# Patient Record
Sex: MALE | Race: WHITE | NOT HISPANIC OR LATINO | Employment: STUDENT | RURAL
[De-identification: names, ages, dates, MRNs, and addresses within clinical notes are randomized per-mention and may not be internally consistent; named-entity substitution may affect disease eponyms.]

---
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
## 2024-03-20 ENCOUNTER — PATIENT OUTREACH (OUTPATIENT)
Dept: ADMINISTRATIVE | Facility: HOSPITAL | Age: 19
End: 2024-03-20

## 2024-03-20 ENCOUNTER — PATIENT MESSAGE (OUTPATIENT)
Dept: ADMINISTRATIVE | Facility: HOSPITAL | Age: 19
End: 2024-03-20

## 2024-03-20 NOTE — PROGRESS NOTES
Population Health Chart Review & Patient Outreach Details  Per [facility] website, insurance is active and pt is listed on the attributed list needing a healthy you performed in   Sent Picket message to Dimitrios to schedule hy  .Sent patient portal message regarding need for:   hy    Further Action Needed If Patient Returns Outreach:            Updates Requested / Reviewed:     []  Care Everywhere    []     []  External Sources ([facility], [facility], DIS, etc.)    [] [facility]   [] [facility]   [] Other:    []  Care Team Updated   []  Removed  or Duplicate Orders   []  Immunization Reconciliation Completed / Queried    [] [place]   [] [place]   [] [place]   [] [place]      Health Maintenance Topics Addressed and Outreach Outcomes / Actions Taken:             Breast Cancer Screening []  Mammogram Order Placed    []  Mammogram Screening Scheduled    []  External Records Requested & Care Team Updated if Applicable    []  External Records Uploaded & Care Team Updated if Applicable    []  Pt Declined Scheduling Mammogram    []  Pt Will Schedule with External Provider / Order Routed & Care Team Updated if Applicable              Cervical Cancer Screening []  Pap Smear Scheduled in Primary Care or OBGYN    []  External Records Requested & Care Team Updated if Applicable       []  External Records Uploaded, Care Team Updated, & History Updated if Applicable    []  Patient Declined Scheduling Pap Smear    []  Patient Will Schedule with External Provider & Care Team Updated if Applicable                  Colorectal Cancer Screening []  Colonoscopy Case Request / Referral / Home Test Order Placed    []  External Records Requested & Care Team Updated if Applicable    []  External Records Uploaded, Care Team Updated, & History Updated if Applicable    []  Patient Declined Completing Colon Cancer Screening    []  Patient Will Schedule with External Provider & Care Team Updated if Applicable    []  Fit Kit Mailed (add  the SmartPhrase under additional notes)    []  Reminded Patient to Complete Home Test                Diabetic Eye Exam []  Eye Exam Screening Order Placed    []  Eye Camera Scheduled or Optometry/Ophthalmology Referral Placed    []  External Records Requested & Care Team Updated if Applicable    []  External Records Uploaded, Care Team Updated, & History Updated if Applicable    []  Patient Declined Scheduling Eye Exam    []  Patient Will Schedule with External Provider & Care Team Updated if Applicable             Blood Pressure Control []  Primary Care Follow Up Visit Scheduled     []  Remote Blood Pressure Reading Captured    []  Patient Declined Remote Reading or Scheduling Appt - Escalated to PCP    []  Patient Will Call Back or Send Portal Message with Reading                 HbA1c & Other Labs []  Overdue Lab(s) Ordered    []  Overdue Lab(s) Scheduled    []  External Records Uploaded & Care Team Updated if Applicable    []  Primary Care Follow Up Visit Scheduled     []  Reminded Patient to Complete A1c Home Test    []  Patient Declined Scheduling Labs or Will Call Back to Schedule    []  Patient Will Schedule with External Provider / Order Routed, & Care Team Updated if Applicable           Primary Care Appointment []  Primary Care Appt Scheduled    []  Patient Declined Scheduling or Will Call Back to Schedule    []  Pt Established with External Provider, Updated Care Team, & Informed Pt to Notify Payor if Applicable           Medication Adherence /    Statin Use []  Primary Care Appointment Scheduled    []  Patient Reminded to  Prescription    []  Patient Declined, Provider Notified if Needed    []  Sent Provider Message to Review to Evaluate Pt for Statin, Add Exclusion Dx Codes, Document   Exclusion in Problem List, Change Statin Intensity Level to Moderate or High Intensity if Applicable                Osteoporosis Screening []  Dexa Order Placed    []  Dexa Appointment Scheduled    []  External  Records Requested & Care Team Updated    []  External Records Uploaded, Care Team Updated, & History Updated if Applicable    []  Patient Declined Scheduling Dexa or Will Call Back to Schedule    []  Patient Will Schedule with External Provider / Order Routed & Care Team Updated if Applicable       Additional Notes:

## 2024-07-25 ENCOUNTER — OFFICE VISIT (OUTPATIENT)
Dept: FAMILY MEDICINE | Facility: CLINIC | Age: 19
End: 2024-07-25
Payer: COMMERCIAL

## 2024-07-25 VITALS
RESPIRATION RATE: 18 BRPM | TEMPERATURE: 97 F | OXYGEN SATURATION: 98 % | SYSTOLIC BLOOD PRESSURE: 110 MMHG | WEIGHT: 152 LBS | BODY MASS INDEX: 21.28 KG/M2 | DIASTOLIC BLOOD PRESSURE: 76 MMHG | HEIGHT: 71 IN | HEART RATE: 66 BPM

## 2024-07-25 DIAGNOSIS — L74.510 HYPERHIDROSIS OF AXILLA: Primary | ICD-10-CM

## 2024-07-25 PROCEDURE — 3008F BODY MASS INDEX DOCD: CPT | Mod: ,,, | Performed by: NURSE PRACTITIONER

## 2024-07-25 PROCEDURE — 3078F DIAST BP <80 MM HG: CPT | Mod: ,,, | Performed by: NURSE PRACTITIONER

## 2024-07-25 PROCEDURE — 1159F MED LIST DOCD IN RCRD: CPT | Mod: ,,, | Performed by: NURSE PRACTITIONER

## 2024-07-25 PROCEDURE — 3074F SYST BP LT 130 MM HG: CPT | Mod: ,,, | Performed by: NURSE PRACTITIONER

## 2024-07-25 PROCEDURE — 99213 OFFICE O/P EST LOW 20 MIN: CPT | Mod: ,,, | Performed by: NURSE PRACTITIONER

## 2024-07-25 PROCEDURE — 1160F RVW MEDS BY RX/DR IN RCRD: CPT | Mod: ,,, | Performed by: NURSE PRACTITIONER

## 2024-07-25 RX ORDER — OXYBUTYNIN CHLORIDE 5 MG/1
5 TABLET, EXTENDED RELEASE ORAL DAILY
Qty: 90 TABLET | Refills: 1 | Status: SHIPPED | OUTPATIENT
Start: 2024-07-25

## 2024-07-25 NOTE — PROGRESS NOTES
RICARDO Sun   [facility]  [address] [address] [address]  [phone]      PATIENT NAME: Scott Brewer  : 2005  DATE: 24  MRN: [id number]      Billing Provider: RICARDO Sun  Level of Service:   Patient PCP Information       Provider PCP Type    RICARDO Sun General            Reason for Visit / Chief Complaint: Excessive Sweating (Has excessive armpit swelling./States they've tried otc prescription deodorant.)       Update PCP  Update Chief Complaint         History of Present Illness / Problem Focused Workflow     18 year old male presents with mom with complaints of excessive sweating  Reports they have tried several OTC products and has not helped  States seems to be getting worse        Review of Systems     Review of Systems   Constitutional:  Negative for fatigue and fever.   HENT:  Negative for congestion.    Respiratory:  Negative for cough and shortness of breath.    Cardiovascular:  Negative for chest pain.   Gastrointestinal:  Negative for abdominal pain, constipation, diarrhea and vomiting.   Endocrine: Negative for polydipsia and polyuria.   Musculoskeletal:  Negative for arthralgias and gait problem.   Skin:         Excessive sweating   Allergic/Immunologic: Negative for environmental allergies.   Neurological:  Negative for dizziness, weakness and headaches.   Psychiatric/Behavioral:  Negative for dysphoric mood. The patient is not nervous/anxious.        Medical / Social / Family History   History reviewed. No pertinent past medical history.    Past Surgical History:   Procedure Laterality Date    KNEE SURGERY Right        Social History    reports that he has never smoked. He has never used smokeless tobacco. He reports that he does not drink alcohol and does not use drugs.    Family History  's family history includes Cancer in his maternal grandfather.    Medications and Allergies      Medications  No outpatient medications have been marked as taking for the 7/25/24 encounter (Office Visit) with Megan Saldana FNP.       Allergies  Review of patient's allergies indicates:  No Known Allergies    Physical Examination     Vitals:    07/25/24 0834   BP: 110/76   Pulse: 66   Resp: 18   Temp: 97.2 °F (36.2 °C)     Physical Exam  Constitutional:       General: He is not in acute distress.  HENT:      Nose: Nose normal.      Mouth/Throat:      Mouth: Mucous membranes are moist.   Eyes:      Extraocular Movements: Extraocular movements intact.   Cardiovascular:      Rate and Rhythm: Normal rate.   Pulmonary:      Effort: Pulmonary effort is normal. No respiratory distress.   Abdominal:      General: Bowel sounds are normal.      Palpations: Abdomen is soft.      Tenderness: There is no abdominal tenderness.   Musculoskeletal:         General: Normal range of motion.      Cervical back: Normal range of motion.   Skin:     General: Skin is warm.   Neurological:      Mental Status: He is alert.   Psychiatric:         Behavior: Behavior normal.           Imaging / Labs     No visits with results within 1 Day(s) from this visit.   Latest known visit with results is:   Office Visit on 07/05/2023   Component Date Value Ref Range Status    HIV 1/2 07/05/2023 Non-Reactive  Non-Reactive Final    Syphilis Ab Interpretation 07/05/2023 Non-Reactive  Non-Reactive Final    Spotted Fever Group Ab, IgG, S 07/05/2023 <1:64  <1:64 Final    Spotted Fever Group Ab, IgM, S 07/05/2023 <1:64  <1:64 Final    Lyme IgG/IgM 07/05/2023 Non-Reactive  Non-Reactive Final    HSV 1, IgG 07/05/2023 Negative  Negative Final    HSV Type 1 Ab IgG Index 07/05/2023 0.03   Final    HSV 2, IgG 07/05/2023 Negative  Negative Final    HSV Type 2 Ab IgG Index 07/05/2023 0.01   Final    Sodium 07/05/2023 143  136 - 145 mmol/L Final    Potassium 07/05/2023 5.0  3.5 - 5.1 mmol/L Final    Chloride 07/05/2023 107  98 - 107 mmol/L Final    CO2  07/05/2023 31  21 - 32 mmol/L Final    Anion Gap 07/05/2023 10  7 - 16 mmol/L Final    Glucose 07/05/2023 77  74 - 106 mg/dL Final    BUN 07/05/2023 20 (H)  7 - 18 mg/dL Final    Creatinine 07/05/2023 1.26  0.70 - 1.30 mg/dL Final    BUN/Creatinine Ratio 07/05/2023 16  6 - 20 Final    Calcium 07/05/2023 9.4  8.5 - 10.1 mg/dL Final    Total Protein 07/05/2023 7.0  6.4 - 8.2 g/dL Final    Albumin 07/05/2023 3.9  3.5 - 5.0 g/dL Final    Globulin 07/05/2023 3.1  2.0 - 4.0 g/dL Final    A/G Ratio 07/05/2023 1.3   Final    Bilirubin, Total 07/05/2023 0.3  >0.0 - 1.2 mg/dL Final    Alk Phos 07/05/2023 128  52 - 222 U/L Final    ALT 07/05/2023 35  16 - 61 U/L Final    AST 07/05/2023 29  15 - 37 U/L Final    eGFR 07/05/2023 85  >=60 mL/min/1.73m2 Final    Mono, Blood 07/05/2023 Positive (A)  Negative, QNS Final    WBC 07/05/2023 6.89  4.50 - 11.00 K/uL Final    RBC 07/05/2023 5.25  4.60 - 6.20 M/uL Final    Hemoglobin 07/05/2023 14.5  13.5 - 18.0 g/dL Final    Hematocrit 07/05/2023 44.8  40.0 - 54.0 % Final    MCV 07/05/2023 85.3  80.0 - 96.0 fL Final    MCH 07/05/2023 27.6  27.0 - 31.0 pg Final    MCHC 07/05/2023 32.4  32.0 - 36.0 g/dL Final    RDW 07/05/2023 12.6  11.5 - 14.5 % Final    Platelet Count 07/05/2023 288  150 - 400 K/uL Final    MPV 07/05/2023 10.5  9.4 - 12.4 fL Final    Neutrophils % 07/05/2023 47.8 (L)  53.0 - 65.0 % Final    Lymphocytes % 07/05/2023 38.9  27.0 - 41.0 % Final    Monocytes % 07/05/2023 8.7 (H)  2.0 - 6.0 % Final    Eosinophils % 07/05/2023 3.9  1.0 - 4.0 % Final    Basophils % 07/05/2023 0.6  0.0 - 1.0 % Final    Immature Granulocytes % 07/05/2023 0.1  0.0 - 0.4 % Final    nRBC, Auto 07/05/2023 0.0  <=0.0 % Final    Neutrophils, Abs 07/05/2023 3.29  1.80 - 7.70 K/uL Final    Lymphocytes, Absolute 07/05/2023 2.68  1.00 - 4.80 K/uL Final    Monocytes, Absolute 07/05/2023 0.60  0.00 - 0.80 K/uL Final    Eosinophils, Absolute 07/05/2023 0.27  0.00 - 0.50 K/uL Final    Basophils, Absolute  07/05/2023 0.04  0.00 - 0.20 K/uL Final    Immature Granulocytes, Absolute 07/05/2023 0.01  0.00 - 0.04 K/uL Final    nRBC, Absolute 07/05/2023 0.00  <=0.00 x10e3/uL Final    Diff Type 07/05/2023 Auto   Final     No image results found.      Assessment and Plan (including Health Maintenance)      Problem List  Smart Sets  Document Outside HM   :    Health Maintenance Due   Topic Date Due    Hepatitis C Screening  Never done    Lipid Panel  Never done    HPV Vaccines (1 - Male 3-dose series) Never done    COVID-19 Vaccine (1 - 2023-24 season) Never done       Problem List Items Addressed This Visit          Derm    Hyperhidrosis of axilla - Primary    Current Assessment & Plan     Reports this has been on ongoing problem for him that seems to be getting worse  Has tried OTC products without much change  Discussed risks/benefits/alternatives for treatment  Start ditropan po, drysol deodorant daily             Relevant Medications    oxybutynin (DITROPAN-XL) 5 MG TR24    aluminum chloride (DRYSOL) 20 % external solution       Health Maintenance Topics with due status: Not Due       Topic Last Completion Date    TETANUS VACCINE 07/26/2017    Influenza Vaccine Not Due       Future Appointments   Date Time Provider Department Center   10/24/2024  3:00 PM Megan Saldana FNP [facility]          Signature:  RICARDO Sun [facility] [address] [place] [address]  [phone]    Date of encounter: 7/25/24

## 2024-07-25 NOTE — ASSESSMENT & PLAN NOTE
Reports this has been on ongoing problem for him that seems to be getting worse  Has tried OTC products without much change  Discussed risks/benefits/alternatives for treatment  Start ditropan po, drysol deodorant daily

## 2024-10-24 ENCOUNTER — OFFICE VISIT (OUTPATIENT)
Dept: FAMILY MEDICINE | Facility: CLINIC | Age: 19
End: 2024-10-24
Payer: COMMERCIAL

## 2024-10-24 VITALS
RESPIRATION RATE: 18 BRPM | HEART RATE: 92 BPM | WEIGHT: 162.81 LBS | BODY MASS INDEX: 22.79 KG/M2 | TEMPERATURE: 98 F | OXYGEN SATURATION: 98 % | HEIGHT: 71 IN | SYSTOLIC BLOOD PRESSURE: 122 MMHG | DIASTOLIC BLOOD PRESSURE: 80 MMHG

## 2024-10-24 DIAGNOSIS — L74.510 HYPERHIDROSIS OF AXILLA: ICD-10-CM

## 2024-10-24 DIAGNOSIS — Z11.59 ENCOUNTER FOR HEPATITIS C SCREENING TEST FOR LOW RISK PATIENT: ICD-10-CM

## 2024-10-24 DIAGNOSIS — Z13.220 SCREENING FOR LIPID DISORDERS: ICD-10-CM

## 2024-10-24 DIAGNOSIS — Z00.00 ROUTINE GENERAL MEDICAL EXAMINATION AT A HEALTH CARE FACILITY: Primary | ICD-10-CM

## 2024-10-24 PROCEDURE — 1160F RVW MEDS BY RX/DR IN RCRD: CPT | Mod: ,,, | Performed by: NURSE PRACTITIONER

## 2024-10-24 PROCEDURE — 1159F MED LIST DOCD IN RCRD: CPT | Mod: ,,, | Performed by: NURSE PRACTITIONER

## 2024-10-24 PROCEDURE — 3079F DIAST BP 80-89 MM HG: CPT | Mod: ,,, | Performed by: NURSE PRACTITIONER

## 2024-10-24 PROCEDURE — 3008F BODY MASS INDEX DOCD: CPT | Mod: ,,, | Performed by: NURSE PRACTITIONER

## 2024-10-24 PROCEDURE — 99395 PREV VISIT EST AGE 18-39: CPT | Mod: ,,, | Performed by: NURSE PRACTITIONER

## 2024-10-24 PROCEDURE — 3074F SYST BP LT 130 MM HG: CPT | Mod: ,,, | Performed by: NURSE PRACTITIONER

## 2024-10-24 RX ORDER — OXYBUTYNIN CHLORIDE 10 MG/1
10 TABLET, EXTENDED RELEASE ORAL DAILY
Qty: 30 TABLET | Refills: 5 | Status: SHIPPED | OUTPATIENT
Start: 2024-10-24 | End: 2025-10-24

## 2024-10-24 RX ORDER — OXYBUTYNIN CHLORIDE 5 MG/1
5 TABLET, EXTENDED RELEASE ORAL DAILY
Qty: 90 TABLET | Refills: 1 | Status: CANCELLED | OUTPATIENT
Start: 2024-10-24

## 2024-10-25 ENCOUNTER — PATIENT OUTREACH (OUTPATIENT)
Facility: HOSPITAL | Age: 19
End: 2024-10-25
Payer: COMMERCIAL

## 2024-10-25 NOTE — PROGRESS NOTES
Population Health Chart Review & Patient Outreach Details      Further Action Needed If Patient Returns Outreach:            Updates Requested / Reviewed:     []  Care Everywhere    []     []  External Sources ([facility], [facility], DIS, etc.)    [] [facility]   [] [facility]   [] Other:    []  Care Team Updated   []  Removed  or Duplicate Orders   []  Immunization Reconciliation Completed / Queried    [] [place]   [] [place]   [] [place]   [] [place]      Health Maintenance Topics Addressed and Outreach Outcomes / Actions Taken:             Breast Cancer Screening []  Mammogram Order Placed    []  Mammogram Screening Scheduled    []  External Records Requested & Care Team Updated if Applicable    []  External Records Uploaded & Care Team Updated if Applicable    []  Pt Declined Scheduling Mammogram    []  Pt Will Schedule with External Provider / Order Routed & Care Team Updated if Applicable              Cervical Cancer Screening []  Pap Smear Scheduled in Primary Care or OBGYN    []  External Records Requested & Care Team Updated if Applicable       []  External Records Uploaded, Care Team Updated, & History Updated if Applicable    []  Patient Declined Scheduling Pap Smear    []  Patient Will Schedule with External Provider & Care Team Updated if Applicable                  Colorectal Cancer Screening []  Colonoscopy Case Request / Referral / Home Test Order Placed    []  External Records Requested & Care Team Updated if Applicable    []  External Records Uploaded, Care Team Updated, & History Updated if Applicable    []  Patient Declined Completing Colon Cancer Screening    []  Patient Will Schedule with External Provider & Care Team Updated if Applicable    []  Fit Kit Mailed (add the SmartPhrase under additional notes)    []  Reminded Patient to Complete Home Test                Diabetic Eye Exam []  Eye Exam Screening Order Placed    []  Eye Camera Scheduled or Optometry/Ophthalmology Referral  Placed    []  External Records Requested & Care Team Updated if Applicable    []  External Records Uploaded, Care Team Updated, & History Updated if Applicable    []  Patient Declined Scheduling Eye Exam    []  Patient Will Schedule with External Provider & Care Team Updated if Applicable             Blood Pressure Control []  Primary Care Follow Up Visit Scheduled     []  Remote Blood Pressure Reading Captured    []  Patient Declined Remote Reading or Scheduling Appt - Escalated to PCP    []  Patient Will Call Back or Send Portal Message with Reading                 HbA1c & Other Labs []  Overdue Lab(s) Ordered    []  Overdue Lab(s) Scheduled    []  External Records Uploaded & Care Team Updated if Applicable    []  Primary Care Follow Up Visit Scheduled     []  Reminded Patient to Complete A1c Home Test    []  Patient Declined Scheduling Labs or Will Call Back to Schedule    []  Patient Will Schedule with External Provider / Order Routed, & Care Team Updated if Applicable           Primary Care Appointment []  Primary Care Appt Scheduled    []  Patient Declined Scheduling or Will Call Back to Schedule    []  Pt Established with External Provider, Updated Care Team, & Informed Pt to Notify Payor if Applicable           Medication Adherence /    Statin Use []  Primary Care Appointment Scheduled    []  Patient Reminded to  Prescription    []  Patient Declined, Provider Notified if Needed    []  Sent Provider Message to Review to Evaluate Pt for Statin, Add Exclusion Dx Codes, Document   Exclusion in Problem List, Change Statin Intensity Level to Moderate or High Intensity if Applicable                Osteoporosis Screening []  Dexa Order Placed    []  Dexa Appointment Scheduled    []  External Records Requested & Care Team Updated    []  External Records Uploaded, Care Team Updated, & History Updated if Applicable    []  Patient Declined Scheduling Dexa or Will Call Back to Schedule    []  Patient Will Schedule  with External Provider / Order Routed & Care Team Updated if Applicable       Additional Notes:.  Post visit Population Health review of encounter with date of service  10/24/24 with Shana.  Not All required HY components in encounter.  Chart is opened and needs cpt for age  18.  Followup appt for10/27/25 HY

## 2024-10-29 PROBLEM — Z00.00 ROUTINE GENERAL MEDICAL EXAMINATION AT A HEALTH CARE FACILITY: Status: ACTIVE | Noted: 2024-10-29

## 2024-12-18 ENCOUNTER — OFFICE VISIT (OUTPATIENT)
Dept: FAMILY MEDICINE | Facility: CLINIC | Age: 19
End: 2024-12-18
Payer: COMMERCIAL

## 2024-12-18 VITALS
WEIGHT: 156.19 LBS | BODY MASS INDEX: 21.87 KG/M2 | RESPIRATION RATE: 20 BRPM | OXYGEN SATURATION: 98 % | HEART RATE: 90 BPM | TEMPERATURE: 97 F | DIASTOLIC BLOOD PRESSURE: 82 MMHG | HEIGHT: 71 IN | SYSTOLIC BLOOD PRESSURE: 125 MMHG

## 2024-12-18 DIAGNOSIS — J32.4 PANSINUSITIS, UNSPECIFIED CHRONICITY: Primary | ICD-10-CM

## 2024-12-18 DIAGNOSIS — R68.84 JAW PAIN: ICD-10-CM

## 2024-12-18 DIAGNOSIS — R22.0 JAW SWELLING: ICD-10-CM

## 2024-12-18 DIAGNOSIS — R09.81 HEAD CONGESTION: ICD-10-CM

## 2024-12-18 DIAGNOSIS — H65.03 BILATERAL ACUTE SEROUS OTITIS MEDIA, RECURRENCE NOT SPECIFIED: ICD-10-CM

## 2024-12-18 PROCEDURE — 99213 OFFICE O/P EST LOW 20 MIN: CPT | Mod: ,,, | Performed by: NURSE PRACTITIONER

## 2024-12-18 PROCEDURE — 3074F SYST BP LT 130 MM HG: CPT | Mod: ,,, | Performed by: NURSE PRACTITIONER

## 2024-12-18 PROCEDURE — 1159F MED LIST DOCD IN RCRD: CPT | Mod: ,,, | Performed by: NURSE PRACTITIONER

## 2024-12-18 PROCEDURE — 3079F DIAST BP 80-89 MM HG: CPT | Mod: ,,, | Performed by: NURSE PRACTITIONER

## 2024-12-18 PROCEDURE — 1160F RVW MEDS BY RX/DR IN RCRD: CPT | Mod: ,,, | Performed by: NURSE PRACTITIONER

## 2024-12-18 PROCEDURE — 3008F BODY MASS INDEX DOCD: CPT | Mod: ,,, | Performed by: NURSE PRACTITIONER

## 2024-12-18 RX ORDER — METHYLPREDNISOLONE 4 MG/1
TABLET ORAL
Qty: 21 EACH | Refills: 0 | Status: SHIPPED | OUTPATIENT
Start: 2024-12-18 | End: 2025-01-08

## 2024-12-18 RX ORDER — CEFDINIR 300 MG/1
300 CAPSULE ORAL 2 TIMES DAILY
Qty: 20 CAPSULE | Refills: 0 | Status: SHIPPED | OUTPATIENT
Start: 2024-12-18 | End: 2024-12-28

## 2024-12-19 ENCOUNTER — HOSPITAL ENCOUNTER (OUTPATIENT)
Dept: RADIOLOGY | Facility: HOSPITAL | Age: 19
Discharge: HOME | End: 2024-12-19
Attending: NURSE PRACTITIONER
Payer: COMMERCIAL

## 2024-12-19 DIAGNOSIS — R68.84 JAW PAIN: ICD-10-CM

## 2024-12-19 DIAGNOSIS — R22.0 JAW SWELLING: ICD-10-CM

## 2024-12-19 PROCEDURE — 70110 X-RAY EXAM OF JAW 4/> VIEWS: CPT | Mod: 26,,, | Performed by: RADIOLOGY

## 2024-12-19 PROCEDURE — 70110 X-RAY EXAM OF JAW 4/> VIEWS: CPT | Mod: TC

## 2024-12-28 NOTE — PROGRESS NOTES
RICARDO Sun   [facility] [address] [place] [address]  [phone]      PATIENT NAME: Scott Brewer  : 2005  DATE: 24  MRN: [id number]      Billing Provider: RICARDO Sun  Level of Service:   Patient PCP Information       Provider PCP Type    RICARDO Sun General                Medications and Allergies     Medications  Outpatient Medications Marked as Taking for the 24 encounter (Office Visit) with Megan Saldana FNP   Medication Sig Dispense Refill    aluminum chloride (DRYSOL) 20 % external solution Apply topically every evening. 60 mL 3    oxybutynin (DITROPAN-XL) 10 MG 24 hr tablet Take 1 tablet (10 mg total) by mouth once daily. 30 tablet 5       Allergies  Review of patient's allergies indicates:  No Known Allergies    History of Present Illness    CHIEF COMPLAINT:  Patient presents today with jaw pain.    TEMPOROMANDIBULAR JOINT (TMJ) SYMPTOMS:  He reports jaw pain present for 3-4 months with unilateral stiffness. He denies morning jaw or teeth soreness, no known grinding teeth at night.    ENT SYMPTOMS:  He reports productive cough and has experienced two nosebleeds in the past 1.5 weeks.      ROS:  General: -fever, -chills, -fatigue, -weight gain, +weight loss  Eyes: -vision changes, -redness, -discharge  ENT: -ear pain, +nasal congestion, -sore throat  Cardiovascular: -chest pain, -palpitations, -lower extremity edema  Respiratory: +cough, -shortness of breath  Gastrointestinal: -abdominal pain, -nausea, -vomiting, -diarrhea, -constipation, -blood in stool  Genitourinary: -dysuria, -hematuria, -frequency  Musculoskeletal: -joint pain, -muscle pain  Skin: -rash, -lesion  Neurological: -headache, -dizziness, -numbness, -tingling  Psychiatric: -anxiety, -depression, -sleep difficulty          Physical Exam    General: No acute distress. Well-developed. Well-nourished.  Eyes: EOMI. Sclerae  anicteric.  HENT: Normocephalic. Atraumatic. Nares patent. Moist oral mucosa.  Ears: Erythematous tympanic membrane with irritation and serous drainage.  Cardiovascular: Regular rate. Regular rhythm.   Respiratory: Normal respiratory effort. Clear to auscultation bilaterally. No rales. No rhonchi. No wheezing.  Abdomen: Soft. Non-tender. Non-distended. Normoactive bowel sounds.  Musculoskeletal: No  obvious deformity.  Extremities: No lower extremity edema.  Neurological: Alert & oriented x3. No slurred speech. Normal gait.  Psychiatric: Normal mood. Normal affect. Good insight. Good judgment.  Skin: Warm. Dry. No rash.          Assessment & Plan    IMPRESSION:  - Suspected jaw pain due to inflammation of tendon or ligament, rather than fracture  - Considered TMJ as a possible diagnosis  - Anticipated need for anti-inflammatory treatment     JAW INFLAMMATION:  - Administered methylprednisolone injection in office for potential jaw inflammation.  - Recommend starting naproxen 600 mg daily for 2-3 weeks, pending x-ray results.  - Ordered x-ray of right jaw.  - Instructed the patient to contact office after completing x-ray at [facility].  - Will call the patient with x-ray results and further treatment instructions.  - Patient reports jaw pain ongoing for 3-4 months, denying waking up with sore jaws or teeth in the morning.  - Examined the jaw and noted it feels different and stiffer on one side.  - Suspect a pulled tendon or ligament rather than a fracture.  - Recommend long-term ibuprofen treatment if TMJ is confirmed.    BILATERAL OTITIS MEDIA:  - prescribed omnicef in office for bilateral ear infection.  - Patient reports ear discomfort.  - Observed redness in both ears during exam.  - Diagnosed bilateral otitis media.    UPPER RESPIRATORY INFECTION:  - Patient reports coughing and nasal symptoms for approximately 1.5 weeks.  - Noted clear lungs on auscultation.  - Acknowledged upper respiratory symptoms.  -  Prescribed methylprednisolone for inflammation.  - Patient reports coughing.  - Noted the cough is dry.    EPISTAXIS:  - Patient reports 2 recent episodes of epistaxis, which is unusual for them.          Health Maintenance Due   Topic Date Due    HPV Vaccines (1 - Male 3-dose series) Never done    COVID-19 Vaccine (1 - 2024-25 season) Never done       Problem List Items Addressed This Visit    None  Visit Diagnoses       Pansinusitis, unspecified chronicity    -  Primary    Relevant Medications    cefdinir (OMNICEF) 300 MG capsule    methylPREDNISolone (MEDROL DOSEPACK) 4 mg tablet    Head congestion        Bilateral acute serous otitis media, recurrence not specified        Relevant Medications    cefdinir (OMNICEF) 300 MG capsule    methylPREDNISolone (MEDROL DOSEPACK) 4 mg tablet    Jaw pain        Relevant Orders    X-Ray Mandible More Than 4 Views (Completed)    Jaw swelling        Relevant Orders    X-Ray Mandible More Than 4 Views (Completed)            Health Maintenance Topics with due status: Not Due       Topic Last Completion Date    TETANUS VACCINE 07/26/2017    RSV Vaccine (Age 60+ and Pregnant patients) Not Due       Future Appointments   Date Time Provider Department Center   4/24/2025  8:40 AM Megan Saldana FNP [facility]   10/27/2025  3:00 PM Megan Saldana FNP [facility]        This note was generated with the assistance of ambient listening technology. Verbal consent was obtained by the patient and accompanying visitor(s) for the recording of patient appointment to facilitate this note. I attest to having reviewed and edited the generated note for accuracy, though some syntax or spelling errors may persist. Please contact the author of this note for any clarification.     Signature:  RICARDO Sun [facility] [address] [place] [address]  [phone]    Date of encounter: 12/18/24

## 2025-01-13 DIAGNOSIS — L74.510 HYPERHIDROSIS OF AXILLA: ICD-10-CM

## 2025-01-13 DIAGNOSIS — K12.1 MOUTH ULCER: Primary | ICD-10-CM

## 2025-01-13 DIAGNOSIS — F41.9 ANXIETY DISORDER, UNSPECIFIED TYPE: ICD-10-CM

## 2025-01-13 RX ORDER — CHLORHEXIDINE GLUCONATE ORAL RINSE 1.2 MG/ML
15 SOLUTION DENTAL 2 TIMES DAILY
Qty: 118 ML | Refills: 0 | Status: SHIPPED | OUTPATIENT
Start: 2025-01-13 | End: 2025-01-28

## 2025-01-13 RX ORDER — FLUOXETINE HYDROCHLORIDE 20 MG/1
20 CAPSULE ORAL DAILY
Qty: 30 CAPSULE | Refills: 1 | Status: SHIPPED | OUTPATIENT
Start: 2025-01-13 | End: 2026-01-13

## 2025-01-13 RX ORDER — OXYBUTYNIN CHLORIDE 10 MG/1
10 TABLET, EXTENDED RELEASE ORAL DAILY
Qty: 30 TABLET | Refills: 5 | Status: SHIPPED | OUTPATIENT
Start: 2025-01-13 | End: 2026-01-13

## 2025-01-28 ENCOUNTER — OFFICE VISIT (OUTPATIENT)
Dept: FAMILY MEDICINE | Facility: CLINIC | Age: 20
End: 2025-01-28
Payer: COMMERCIAL

## 2025-01-28 VITALS
RESPIRATION RATE: 18 BRPM | SYSTOLIC BLOOD PRESSURE: 124 MMHG | DIASTOLIC BLOOD PRESSURE: 78 MMHG | HEIGHT: 71 IN | WEIGHT: 159 LBS | BODY MASS INDEX: 22.26 KG/M2 | OXYGEN SATURATION: 98 % | HEART RATE: 87 BPM | TEMPERATURE: 98 F

## 2025-01-28 DIAGNOSIS — L74.510 HYPERHIDROSIS OF AXILLA: Primary | ICD-10-CM

## 2025-01-28 DIAGNOSIS — F41.9 ANXIETY DISORDER, UNSPECIFIED TYPE: ICD-10-CM

## 2025-01-28 LAB
25(OH)D3 SERPL-MCNC: 49.5 NG/ML (ref 30–80)
MAGNESIUM SERPL-MCNC: 2.1 MG/DL (ref 1.6–2.6)
TSH SERPL DL<=0.005 MIU/L-ACNC: 1.59 UIU/ML (ref 0.35–4.94)

## 2025-01-28 PROCEDURE — 3078F DIAST BP <80 MM HG: CPT | Mod: ,,, | Performed by: NURSE PRACTITIONER

## 2025-01-28 PROCEDURE — 83735 ASSAY OF MAGNESIUM: CPT | Mod: ,,, | Performed by: CLINICAL MEDICAL LABORATORY

## 2025-01-28 PROCEDURE — 3074F SYST BP LT 130 MM HG: CPT | Mod: ,,, | Performed by: NURSE PRACTITIONER

## 2025-01-28 PROCEDURE — 82306 VITAMIN D 25 HYDROXY: CPT | Mod: ,,, | Performed by: CLINICAL MEDICAL LABORATORY

## 2025-01-28 PROCEDURE — 1160F RVW MEDS BY RX/DR IN RCRD: CPT | Mod: ,,, | Performed by: NURSE PRACTITIONER

## 2025-01-28 PROCEDURE — 3008F BODY MASS INDEX DOCD: CPT | Mod: ,,, | Performed by: NURSE PRACTITIONER

## 2025-01-28 PROCEDURE — 1159F MED LIST DOCD IN RCRD: CPT | Mod: ,,, | Performed by: NURSE PRACTITIONER

## 2025-01-28 PROCEDURE — 84443 ASSAY THYROID STIM HORMONE: CPT | Mod: ,,, | Performed by: CLINICAL MEDICAL LABORATORY

## 2025-01-28 PROCEDURE — 99214 OFFICE O/P EST MOD 30 MIN: CPT | Mod: ,,, | Performed by: NURSE PRACTITIONER

## 2025-01-28 RX ORDER — METOPROLOL SUCCINATE 25 MG/1
25 TABLET, EXTENDED RELEASE ORAL DAILY
Qty: 90 TABLET | Refills: 3 | Status: CANCELLED | OUTPATIENT
Start: 2025-01-28 | End: 2026-01-28

## 2025-01-28 RX ORDER — GLYCOPYRRONIUM 2.4 G/100G
1 CLOTH TOPICAL NIGHTLY
Qty: 30 EACH | Refills: 2 | Status: SHIPPED | OUTPATIENT
Start: 2025-01-28

## 2025-01-28 NOTE — PROGRESS NOTES
Health Maintenance Due   Topic Date Due    HPV Vaccines (1 - Male 3-dose series) Never done    COVID-19 Vaccine (1 - 2024-25 season) Never done     Discussed care gaps.  Not interested in vaccs.

## 2025-01-30 NOTE — PROGRESS NOTES
RICARDO Sun   [facility]  [address] [address] [address]  [phone]      PATIENT NAME: Scott Brewer  : 2005  DATE: 25  MRN: [id number]      Billing Provider: RICARDO Sun  Level of Service:   Patient PCP Information       Provider PCP Type    RICARDO Snu General                Medications and Allergies     Medications  Outpatient Medications Marked as Taking for the 25 encounter (Office Visit) with Megan Saldana FNP   Medication Sig Dispense Refill    aluminum chloride (DRYSOL) 20 % external solution Apply topically every evening. 60 mL 3    FLUoxetine 20 MG capsule Take 1 capsule (20 mg total) by mouth once daily. 30 capsule 1    oxybutynin (DITROPAN-XL) 10 MG 24 hr tablet Take 1 tablet (10 mg total) by mouth once daily. 30 tablet 5       Allergies  Review of patient's allergies indicates:  No Known Allergies    History of Present Illness    CHIEF COMPLAINT:  Patient presents today for follow up of excessive sweating.    HYPERHIDROSIS:  He reports excessive sweating occurring only in the axilla (underarm) area. The symptoms are consistent throughout the day and have returned to baseline levels. He is currently using drysol for management. He also reports significant concurrent weight loss.    MEDICATIONS:  He continues Prozac without experiencing nausea as a side effect. He denies any GI symptoms.    FAMILY HISTORY:  He is uncertain about family history of thyroid problems, unable to recall if his grandmother has thyroid issues.      ROS:  General: -fever, -chills, -fatigue, -weight gain, +weight loss  Eyes: -vision changes, -redness, -discharge  ENT: -ear pain, -nasal congestion, -sore throat  Cardiovascular: -chest pain, -palpitations, -lower extremity edema  Respiratory: -cough, -shortness of breath  Gastrointestinal: -abdominal pain, -nausea, -vomiting, -diarrhea, -constipation, -blood in  stool  Genitourinary: -dysuria, -hematuria, -frequency  Musculoskeletal: -joint pain, -muscle pain  Skin: -rash, -lesion  Neurological: -headache, -dizziness, -numbness, -tingling  Psychiatric: -anxiety, -depression, -sleep difficulty  Endocrine: +excessive sweating          Physical Exam    General: No acute distress. Well-developed. Well-nourished.  Eyes: EOMI. Sclerae anicteric.  HENT: Normocephalic. Atraumatic.   Cardiovascular: Regular rate. Regular rhythm.  Respiratory: Normal respiratory effort. Clear to auscultation bilaterally.   Abdomen: Soft. Non-tender. Non-distended.  Musculoskeletal: No  obvious deformity.  Extremities: No lower extremity edema.  Neurological: Alert & oriented x3. No slurred speech. Normal gait.  Psychiatric: Normal mood. Normal affect. Good insight. Good judgment.  Skin: Warm. Dry. No rash.          Assessment & Plan    IMPRESSION:  - Considering anxiety as underlying cause of patient's symptoms  - Evaluating for potential thyroid dysfunction due to sweating and weight loss  - Assessing need for additional diagnostic tests including magnesium, thyroid, and vitamin D levels  - Exploring alternative treatments for hyperhidrosis, including towelettes and Metoprolol  - Considering Botox injections as a potential treatment option if lab results are normal    ANXIETY:  - Continued Prozac for anxiety management.  - Patient is taking medication daily for anxiety.  - Evaluated that anxiety is likely the underlying cause of the patient's symptoms.  - Instructed the patient to report any changes in symptoms or side effects from the medication.  - May start Metoprolol (beta blocker) pending lab results, to be taken with Prozac if prescribed.  - Considered Metoprolol as a potential treatment option.    HYPERHIDROSIS:  - Patient reports sweating is back to previous levels, occurring consistently throughout the day, primarily in the axilla (underarm) area.  - Recommend alternating between current  antiperspirant and new towelettes.  - Initiated Qbrexza towelettes to be used in place of dry salt antiperspirant.  - May refer for Botox injections if lab results are normal and other treatments are ineffective.  - Advised the patient to follow up on insurance coverage for potential Botox treatment.    THYROID DISORDER:  - Noted that the patient's sweating and weight loss could be indicative of thyroid issues.  - Ordered thyroid function tests.  - Inquired about family history of thyroid problems.    LABS:  - Planned to perform labs to check thyroid function, magnesium, and vitamin D levels.  - Planned to check other potential causes, including thyroid, magnesium, and vitamin D levels.  - Magnesium level and vitamin D level ordered.  - Instructed the patient to fast before the labs and to schedule them as soon as possible.    FOLLOW UP:  - Follow up in 3 months.  - Follow up sooner if symptoms worsen or new concerns arise.          Health Maintenance Due   Topic Date Due    HPV Vaccines (1 - Male 3-dose series) Never done    COVID-19 Vaccine (1 - 2024-25 season) Never done       Problem List Items Addressed This Visit          Derm    Hyperhidrosis of axilla - Primary    Relevant Medications    glycopyrronium tosylate (QBREXZA) 2.4 % Towl    Other Relevant Orders    TSH (Completed)    Vitamin D (Completed)    Magnesium (Completed)     Other Visit Diagnoses       Anxiety disorder, unspecified type                Health Maintenance Topics with due status: Not Due       Topic Last Completion Date    TETANUS VACCINE 07/26/2017    RSV Vaccine (Age 60+ and Pregnant patients) Not Due       Future Appointments   Date Time Provider Department Center   4/24/2025  8:40 AM Shana, Samantha, FNP [facility]   4/28/2025  1:40 PM Shana, Samantha, FNP [facility]   10/27/2025  3:00 PM Shana, Samantha, FNP [facility]        This note was generated with the assistance of ambient listening  technology. Verbal consent was obtained by the patient and accompanying visitor(s) for the recording of patient appointment to facilitate this note. I attest to having reviewed and edited the generated note for accuracy, though some syntax or spelling errors may persist. Please contact the author of this note for any clarification.     Signature:  RICARDO Sun  [facility] [address] [place] [address]  [phone]    Date of encounter: 1/28/25

## 2025-02-05 DIAGNOSIS — F41.9 ANXIETY DISORDER, UNSPECIFIED TYPE: ICD-10-CM

## 2025-02-05 RX ORDER — FLUOXETINE HYDROCHLORIDE 20 MG/1
20 CAPSULE ORAL
Qty: 30 CAPSULE | Refills: 1 | OUTPATIENT
Start: 2025-02-05

## 2025-02-05 RX ORDER — FLUOXETINE HYDROCHLORIDE 20 MG/1
20 CAPSULE ORAL DAILY
Qty: 30 CAPSULE | Refills: 5 | Status: SHIPPED | OUTPATIENT
Start: 2025-02-05 | End: 2026-02-05

## 2025-03-25 DIAGNOSIS — L74.510 HYPERHIDROSIS OF AXILLA: ICD-10-CM

## 2025-03-25 RX ORDER — GLYCOPYRRONIUM 2.4 G/100G
CLOTH TOPICAL
Qty: 30 EACH | Refills: 2 | Status: SHIPPED | OUTPATIENT
Start: 2025-03-25

## 2025-06-24 DIAGNOSIS — L74.510 HYPERHIDROSIS OF AXILLA: ICD-10-CM

## 2025-06-25 RX ORDER — ALUMINUM CHLORIDE 20 %
1 SOLUTION, NON-ORAL TOPICAL NIGHTLY
Qty: 60 ML | Refills: 3 | Status: SHIPPED | OUTPATIENT
Start: 2025-06-25

## 2025-08-06 ENCOUNTER — OFFICE VISIT (OUTPATIENT)
Dept: FAMILY MEDICINE | Facility: CLINIC | Age: 20
End: 2025-08-06
Payer: COMMERCIAL

## 2025-08-06 VITALS
TEMPERATURE: 98 F | OXYGEN SATURATION: 98 % | HEART RATE: 102 BPM | HEIGHT: 71 IN | DIASTOLIC BLOOD PRESSURE: 88 MMHG | WEIGHT: 173 LBS | SYSTOLIC BLOOD PRESSURE: 116 MMHG | RESPIRATION RATE: 18 BRPM | BODY MASS INDEX: 24.22 KG/M2

## 2025-08-06 DIAGNOSIS — R05.1 ACUTE COUGH: ICD-10-CM

## 2025-08-06 DIAGNOSIS — R09.81 HEAD CONGESTION: ICD-10-CM

## 2025-08-06 DIAGNOSIS — U07.1 COVID: Primary | ICD-10-CM

## 2025-08-06 LAB
CTP QC/QA: YES
SARS-COV-2 RDRP RESP QL NAA+PROBE: POSITIVE

## 2025-08-06 PROCEDURE — 87635 SARS-COV-2 COVID-19 AMP PRB: CPT | Mod: QW,,, | Performed by: NURSE PRACTITIONER

## 2025-08-06 PROCEDURE — 3008F BODY MASS INDEX DOCD: CPT | Mod: ,,, | Performed by: NURSE PRACTITIONER

## 2025-08-06 PROCEDURE — 3074F SYST BP LT 130 MM HG: CPT | Mod: ,,, | Performed by: NURSE PRACTITIONER

## 2025-08-06 PROCEDURE — 1160F RVW MEDS BY RX/DR IN RCRD: CPT | Mod: ,,, | Performed by: NURSE PRACTITIONER

## 2025-08-06 PROCEDURE — 1159F MED LIST DOCD IN RCRD: CPT | Mod: ,,, | Performed by: NURSE PRACTITIONER

## 2025-08-06 PROCEDURE — 99213 OFFICE O/P EST LOW 20 MIN: CPT | Mod: ,,, | Performed by: NURSE PRACTITIONER

## 2025-08-06 PROCEDURE — 3079F DIAST BP 80-89 MM HG: CPT | Mod: ,,, | Performed by: NURSE PRACTITIONER

## 2025-08-06 RX ORDER — GUAIFENESIN AND DEXTROMETHORPHAN HYDROBROMIDE 1200; 60 MG/1; MG/1
1 TABLET, EXTENDED RELEASE ORAL 2 TIMES DAILY
Qty: 20 TABLET | Refills: 0 | Status: SHIPPED | OUTPATIENT
Start: 2025-08-06 | End: 2025-08-16

## 2025-08-06 NOTE — PROGRESS NOTES
Health Maintenance Due   Topic Date Due    HPV Vaccines (1 - Male 3-dose series) Never done    COVID-19 Vaccine (1 - 2024-25 season) Never done     Discussed care gaps.  Declined both vaccs.

## 2025-08-06 NOTE — PROGRESS NOTES
RICARDO Sun   [facility] [address] [place] [address]  [phone]      PATIENT NAME: Scott Brewer  : 2005  DATE: 25  MRN: [id number]      Billing Provider: RICARDO Sun  Level of Service:   Patient PCP Information       Provider PCP Type    RICARDO Sun General            Reason for Visit / Chief Complaint: Chills, Fever, Generalized Body Aches, Fatigue, Cough (Prod cough/yellow phlegm.), Headache, Nasal Congestion, and Sore Throat (All symptoms X1 day)     History of Present Illness    HPI:  Patient reports cough, congestion, body aches, fatigue, fever, and head congestion. These symptoms began earlier today. He tested positive for COVID-19 during a screening conducted today.    MEDICAL HISTORY:  Patient has a history of COVID-19, having tested positive during the current visit.      ROS:  General: +fever, -chills, +fatigue, -weight gain, -weight loss  Eyes: -vision changes, -redness, -discharge  ENT: -ear pain, +nasal congestion, -sore throat  Cardiovascular: -chest pain, -palpitations, -lower extremity edema  Respiratory: +cough, -shortness of breath, +chest congestion  Gastrointestinal: -abdominal pain, -nausea, -vomiting, -diarrhea, -constipation, -blood in stool  Genitourinary: -dysuria, -hematuria, -frequency  Musculoskeletal: -joint pain, -muscle pain, +body aches  Skin: -rash, -lesion  Neurological: -headache, -dizziness, -numbness, -tingling  Psychiatric: -anxiety, -depression, -sleep difficulty          Medications and Allergies     Medications  Outpatient Medications Marked as Taking for the 25 encounter (Office Visit) with Megan Saldana FNP   Medication Sig Dispense Refill    DRYSOL DAB-O-MATIC 20 % external solution APPLY topically EVERY EVENING 60 mL 3    FLUoxetine 20 MG capsule Take 1 capsule (20 mg total) by mouth once daily. 30 capsule 5    oxybutynin (DITROPAN-XL) 10 MG 24 hr tablet Take 1  tablet (10 mg total) by mouth once daily. 30 tablet 5    QBREXZA 2.4 % Towl APPLY ONE topically EVERY EVENING 30 each 2       Allergies  Review of patient's allergies indicates:  No Known Allergies    Physical Exam  Constitutional:       General: He is not in acute distress.     Appearance: He is ill-appearing.   HENT:      Head: Normocephalic.      Nose: Congestion present.      Mouth/Throat:      Mouth: Mucous membranes are moist.      Pharynx: Posterior oropharyngeal erythema present.   Eyes:      Extraocular Movements: Extraocular movements intact.   Cardiovascular:      Rate and Rhythm: Normal rate.   Pulmonary:      Effort: Pulmonary effort is normal. No respiratory distress.      Breath sounds: No wheezing.   Abdominal:      General: Bowel sounds are normal.      Palpations: Abdomen is soft.   Musculoskeletal:         General: Normal range of motion.      Cervical back: Normal range of motion.   Skin:     General: Skin is warm.   Neurological:      Mental Status: He is alert and oriented to person, place, and time.          Assessment & Plan    IMPRESSION:  - Patient tested positive for COVID-19.    COVID-19:  - Patient tested positive for  screening today, presenting with cough, congestion, body aches, fatigue, and fever.  - Advised to increase fluid intake, maintain adequate rest, and isolate from others to prevent transmission.  - Prescribed Mucinex BID to manage symptoms.  - Provided an excuse note to return to work on Monday.    FOLLOW-UP:  - Recommend follow-up if symptoms worsen.          Health Maintenance Due   Topic Date Due    HPV Vaccines (1 - Male 3-dose series) Never done    COVID-19 Vaccine (1 - 2024-25 season) Never done       Problem List Items Addressed This Visit    None  Visit Diagnoses         COVID    -  Primary      Acute cough        Relevant Orders    POCT COVID-19 Rapid Screening (Completed)      Head congestion        Relevant Medications    dextromethorphan-guaiFENesin (MUCINEX DM)  60-1,200 mg per 12 hr tablet            Health Maintenance Topics with due status: Not Due       Topic Last Completion Date    TETANUS VACCINE 07/26/2017    Influenza Vaccine 12/18/2024    RSV Vaccine (Age 60+ and Pregnant patients) Not Due       Future Appointments   Date Time Provider Department Center   10/27/2025  3:00 PM Megan Saldana FNP [facility]        This note was generated with the assistance of ambient listening technology. Verbal consent was obtained by the patient and accompanying visitor(s) for the recording of patient appointment to facilitate this note. I attest to having reviewed and edited the generated note for accuracy, though some syntax or spelling errors may persist. Please contact the author of this note for any clarification.     Signature:  RICARDO Sun [facility] [address] [place] [address]  [phone]    Date of encounter: 8/6/25

## 2025-08-06 NOTE — LETTER
August 6, 2025      [facility] [address] [place] [address]  Phone: [phone]  Fax: [phone]       Patient: Scott Brewer   YOB: 2005  Date of Visit: 08/06/2025    To Whom It May Concern:    JUAN C Brewer  was at [facility] on 08/06/2025. The patient may return to work/school on 08/11/2025  with out restrictions. If you have any questions or concerns, or if I can be of further assistance, please do not hesitate to contact me.    Sincerely,

## 2025-08-07 DIAGNOSIS — L74.510 HYPERHIDROSIS OF AXILLA: ICD-10-CM

## 2025-08-07 RX ORDER — GLYCOPYRRONIUM 2.4 G/100G
CLOTH TOPICAL
Qty: 30 EACH | Refills: 2 | Status: SHIPPED | OUTPATIENT
Start: 2025-08-07